# Patient Record
Sex: MALE | Race: WHITE | NOT HISPANIC OR LATINO | URBAN - METROPOLITAN AREA
[De-identification: names, ages, dates, MRNs, and addresses within clinical notes are randomized per-mention and may not be internally consistent; named-entity substitution may affect disease eponyms.]

---

## 2020-08-18 ENCOUNTER — EMERGENCY (EMERGENCY)
Facility: HOSPITAL | Age: 40
LOS: 1 days | Discharge: HOME | End: 2020-08-18
Attending: EMERGENCY MEDICINE | Admitting: EMERGENCY MEDICINE
Payer: COMMERCIAL

## 2020-08-18 VITALS
TEMPERATURE: 99 F | SYSTOLIC BLOOD PRESSURE: 165 MMHG | DIASTOLIC BLOOD PRESSURE: 72 MMHG | WEIGHT: 225.09 LBS | OXYGEN SATURATION: 96 % | HEART RATE: 99 BPM | RESPIRATION RATE: 18 BRPM

## 2020-08-18 LAB
ALBUMIN SERPL ELPH-MCNC: 4 G/DL — SIGNIFICANT CHANGE UP (ref 3.4–5)
ALP SERPL-CCNC: 86 U/L — SIGNIFICANT CHANGE UP (ref 40–120)
ALT FLD-CCNC: 40 U/L — SIGNIFICANT CHANGE UP (ref 12–42)
AMYLASE P1 CFR SERPL: 37 U/L — SIGNIFICANT CHANGE UP (ref 25–115)
ANION GAP SERPL CALC-SCNC: 9 MMOL/L — SIGNIFICANT CHANGE UP (ref 9–16)
AST SERPL-CCNC: 30 U/L — SIGNIFICANT CHANGE UP (ref 15–37)
BILIRUB SERPL-MCNC: 0.4 MG/DL — SIGNIFICANT CHANGE UP (ref 0.2–1.2)
BUN SERPL-MCNC: 13 MG/DL — SIGNIFICANT CHANGE UP (ref 7–23)
CALCIUM SERPL-MCNC: 9 MG/DL — SIGNIFICANT CHANGE UP (ref 8.5–10.5)
CHLORIDE SERPL-SCNC: 103 MMOL/L — SIGNIFICANT CHANGE UP (ref 96–108)
CO2 SERPL-SCNC: 28 MMOL/L — SIGNIFICANT CHANGE UP (ref 22–31)
CREAT SERPL-MCNC: 1.12 MG/DL — SIGNIFICANT CHANGE UP (ref 0.5–1.3)
GLUCOSE SERPL-MCNC: 106 MG/DL — HIGH (ref 70–99)
HCT VFR BLD CALC: 41.8 % — SIGNIFICANT CHANGE UP (ref 39–50)
HGB BLD-MCNC: 13.9 G/DL — SIGNIFICANT CHANGE UP (ref 13–17)
INR BLD: 1.07 — SIGNIFICANT CHANGE UP (ref 0.88–1.16)
LIDOCAIN IGE QN: 42 U/L — LOW (ref 73–393)
MCHC RBC-ENTMCNC: 29.8 PG — SIGNIFICANT CHANGE UP (ref 27–34)
MCHC RBC-ENTMCNC: 33.3 GM/DL — SIGNIFICANT CHANGE UP (ref 32–36)
MCV RBC AUTO: 89.5 FL — SIGNIFICANT CHANGE UP (ref 80–100)
NRBC # BLD: 0 /100 WBCS — SIGNIFICANT CHANGE UP (ref 0–0)
PLATELET # BLD AUTO: 212 K/UL — SIGNIFICANT CHANGE UP (ref 150–400)
POTASSIUM SERPL-MCNC: 3.8 MMOL/L — SIGNIFICANT CHANGE UP (ref 3.5–5.3)
POTASSIUM SERPL-SCNC: 3.8 MMOL/L — SIGNIFICANT CHANGE UP (ref 3.5–5.3)
PROT SERPL-MCNC: 7.9 G/DL — SIGNIFICANT CHANGE UP (ref 6.4–8.2)
PROTHROM AB SERPL-ACNC: 12.8 SEC — SIGNIFICANT CHANGE UP (ref 10.6–13.6)
RBC # BLD: 4.67 M/UL — SIGNIFICANT CHANGE UP (ref 4.2–5.8)
RBC # FLD: 14.3 % — SIGNIFICANT CHANGE UP (ref 10.3–14.5)
SODIUM SERPL-SCNC: 140 MMOL/L — SIGNIFICANT CHANGE UP (ref 132–145)
WBC # BLD: 10.97 K/UL — HIGH (ref 3.8–10.5)
WBC # FLD AUTO: 10.97 K/UL — HIGH (ref 3.8–10.5)

## 2020-08-18 PROCEDURE — 99285 EMERGENCY DEPT VISIT HI MDM: CPT

## 2020-08-18 PROCEDURE — 74176 CT ABD & PELVIS W/O CONTRAST: CPT | Mod: 26

## 2020-08-18 RX ORDER — TAMSULOSIN HYDROCHLORIDE 0.4 MG/1
1 CAPSULE ORAL
Qty: 3 | Refills: 0
Start: 2020-08-18 | End: 2020-08-20

## 2020-08-18 RX ORDER — KETOROLAC TROMETHAMINE 30 MG/ML
30 SYRINGE (ML) INJECTION ONCE
Refills: 0 | Status: DISCONTINUED | OUTPATIENT
Start: 2020-08-18 | End: 2020-08-18

## 2020-08-18 RX ORDER — IBUPROFEN 200 MG
1 TABLET ORAL
Qty: 20 | Refills: 0
Start: 2020-08-18 | End: 2020-08-22

## 2020-08-18 RX ORDER — SODIUM CHLORIDE 9 MG/ML
1000 INJECTION INTRAMUSCULAR; INTRAVENOUS; SUBCUTANEOUS ONCE
Refills: 0 | Status: COMPLETED | OUTPATIENT
Start: 2020-08-18 | End: 2020-08-18

## 2020-08-18 RX ADMIN — Medication 30 MILLIGRAM(S): at 18:35

## 2020-08-18 RX ADMIN — SODIUM CHLORIDE 1000 MILLILITER(S): 9 INJECTION INTRAMUSCULAR; INTRAVENOUS; SUBCUTANEOUS at 18:35

## 2020-08-18 NOTE — ED PROVIDER NOTE - NSFOLLOWUPINSTRUCTIONS_ED_ALL_ED_FT
Call for a primary care appointment this week.  Take ibuprofen 600mg every 6 hours as needed for pain.  Take Flomax 0.4mg at bedtime for three nights.  Return at any time if you have any concerns.

## 2020-08-18 NOTE — ED PROVIDER NOTE - NSFOLLOWUPCLINICS_GEN_ALL_ED_FT
[facility]  HIV/AIDS Research & Treatment  [address] [place] [address]  Phone: [phone]  Fax:   Follow Up Time:

## 2020-08-18 NOTE — ED PROVIDER NOTE - PATIENT PORTAL LINK FT
You can access the FollowMyHealth Patient Portal offered by [facility] by registering at the following website: [email or web address]. By joining [employer]’s FollowMyHealth portal, you will also be able to view your health information using other applications (apps) compatible with our system.

## 2020-08-18 NOTE — ED PROVIDER NOTE - CARE PROVIDERS DIRECT ADDRESSES
[email or web address]

## 2020-08-22 ENCOUNTER — EMERGENCY (EMERGENCY)
Facility: HOSPITAL | Age: 40
LOS: 1 days | Discharge: HOME | End: 2020-08-22
Attending: EMERGENCY MEDICINE | Admitting: EMERGENCY MEDICINE
Payer: COMMERCIAL

## 2020-08-22 ENCOUNTER — TRANSCRIPTION ENCOUNTER (OUTPATIENT)
Age: 40
End: 2020-08-22

## 2020-08-22 VITALS
SYSTOLIC BLOOD PRESSURE: 123 MMHG | WEIGHT: 225.09 LBS | DIASTOLIC BLOOD PRESSURE: 79 MMHG | OXYGEN SATURATION: 97 % | HEIGHT: 74 IN | TEMPERATURE: 99 F | HEART RATE: 105 BPM | RESPIRATION RATE: 18 BRPM

## 2020-08-22 DIAGNOSIS — N39.0 URINARY TRACT INFECTION, SITE NOT SPECIFIED: ICD-10-CM

## 2020-08-22 DIAGNOSIS — N23 UNSPECIFIED RENAL COLIC: ICD-10-CM

## 2020-08-22 DIAGNOSIS — R31.9 HEMATURIA, UNSPECIFIED: ICD-10-CM

## 2020-08-22 DIAGNOSIS — B20 HUMAN IMMUNODEFICIENCY VIRUS [HIV] DISEASE: ICD-10-CM

## 2020-08-22 DIAGNOSIS — R50.9 FEVER, UNSPECIFIED: ICD-10-CM

## 2020-08-22 DIAGNOSIS — N20.1 CALCULUS OF URETER: ICD-10-CM

## 2020-08-22 DIAGNOSIS — R11.0 NAUSEA: ICD-10-CM

## 2020-08-22 DIAGNOSIS — Z20.828 CONTACT WITH AND (SUSPECTED) EXPOSURE TO OTHER VIRAL COMMUNICABLE DISEASES: ICD-10-CM

## 2020-08-22 DIAGNOSIS — R10.9 UNSPECIFIED ABDOMINAL PAIN: ICD-10-CM

## 2020-08-22 LAB
ALBUMIN SERPL ELPH-MCNC: 3.8 G/DL — SIGNIFICANT CHANGE UP (ref 3.4–5)
ALP SERPL-CCNC: 82 U/L — SIGNIFICANT CHANGE UP (ref 40–120)
ALT FLD-CCNC: 17 U/L — SIGNIFICANT CHANGE UP (ref 12–42)
ANION GAP SERPL CALC-SCNC: 11 MMOL/L — SIGNIFICANT CHANGE UP (ref 9–16)
AST SERPL-CCNC: 14 U/L — LOW (ref 15–37)
BASOPHILS # BLD AUTO: 0 K/UL — SIGNIFICANT CHANGE UP (ref 0–0.2)
BASOPHILS NFR BLD AUTO: 0 % — SIGNIFICANT CHANGE UP (ref 0–2)
BILIRUB SERPL-MCNC: 0.9 MG/DL — SIGNIFICANT CHANGE UP (ref 0.2–1.2)
BUN SERPL-MCNC: 12 MG/DL — SIGNIFICANT CHANGE UP (ref 7–23)
CALCIUM SERPL-MCNC: 9.5 MG/DL — SIGNIFICANT CHANGE UP (ref 8.5–10.5)
CHLORIDE SERPL-SCNC: 97 MMOL/L — SIGNIFICANT CHANGE UP (ref 96–108)
CO2 SERPL-SCNC: 27 MMOL/L — SIGNIFICANT CHANGE UP (ref 22–31)
CREAT SERPL-MCNC: 1.13 MG/DL — SIGNIFICANT CHANGE UP (ref 0.5–1.3)
CRP SERPL-MCNC: >12 MG/DL — HIGH (ref 0–0.9)
EOSINOPHIL # BLD AUTO: 0 K/UL — SIGNIFICANT CHANGE UP (ref 0–0.5)
EOSINOPHIL NFR BLD AUTO: 0 % — SIGNIFICANT CHANGE UP (ref 0–6)
GLUCOSE SERPL-MCNC: 102 MG/DL — HIGH (ref 70–99)
HCT VFR BLD CALC: 43.7 % — SIGNIFICANT CHANGE UP (ref 39–50)
HGB BLD-MCNC: 15.2 G/DL — SIGNIFICANT CHANGE UP (ref 13–17)
LYMPHOCYTES # BLD AUTO: 1.58 K/UL — SIGNIFICANT CHANGE UP (ref 1–3.3)
LYMPHOCYTES # BLD AUTO: 12 % — LOW (ref 13–44)
MCHC RBC-ENTMCNC: 30.3 PG — SIGNIFICANT CHANGE UP (ref 27–34)
MCHC RBC-ENTMCNC: 34.8 GM/DL — SIGNIFICANT CHANGE UP (ref 32–36)
MCV RBC AUTO: 87.2 FL — SIGNIFICANT CHANGE UP (ref 80–100)
MONOCYTES # BLD AUTO: 2.5 K/UL — HIGH (ref 0–0.9)
MONOCYTES NFR BLD AUTO: 19 % — HIGH (ref 2–14)
NEUTROPHILS # BLD AUTO: 9.09 K/UL — HIGH (ref 1.8–7.4)
NEUTROPHILS NFR BLD AUTO: 69 % — SIGNIFICANT CHANGE UP (ref 43–77)
NRBC # BLD: SIGNIFICANT CHANGE UP /100 WBCS (ref 0–0)
PLATELET # BLD AUTO: 239 K/UL — SIGNIFICANT CHANGE UP (ref 150–400)
POTASSIUM SERPL-MCNC: 4.2 MMOL/L — SIGNIFICANT CHANGE UP (ref 3.5–5.3)
POTASSIUM SERPL-SCNC: 4.2 MMOL/L — SIGNIFICANT CHANGE UP (ref 3.5–5.3)
PROT SERPL-MCNC: 8.8 G/DL — HIGH (ref 6.4–8.2)
RBC # BLD: 5.01 M/UL — SIGNIFICANT CHANGE UP (ref 4.2–5.8)
RBC # FLD: 14.1 % — SIGNIFICANT CHANGE UP (ref 10.3–14.5)
SODIUM SERPL-SCNC: 135 MMOL/L — SIGNIFICANT CHANGE UP (ref 132–145)
WBC # BLD: 13.18 K/UL — HIGH (ref 3.8–10.5)
WBC # FLD AUTO: 13.18 K/UL — HIGH (ref 3.8–10.5)

## 2020-08-22 PROCEDURE — 71046 X-RAY EXAM CHEST 2 VIEWS: CPT | Mod: 26

## 2020-08-22 PROCEDURE — 99220: CPT

## 2020-08-22 RX ORDER — ACETAMINOPHEN 500 MG
975 TABLET ORAL ONCE
Refills: 0 | Status: COMPLETED | OUTPATIENT
Start: 2020-08-22 | End: 2020-08-22

## 2020-08-22 RX ORDER — PENICILLIN G BENZATHINE 1200000 [IU]/2ML
2.4 INJECTION, SUSPENSION INTRAMUSCULAR ONCE
Refills: 0 | Status: COMPLETED | OUTPATIENT
Start: 2020-08-22 | End: 2020-08-22

## 2020-08-22 RX ORDER — KETOROLAC TROMETHAMINE 30 MG/ML
30 SYRINGE (ML) INJECTION ONCE
Refills: 0 | Status: DISCONTINUED | OUTPATIENT
Start: 2020-08-22 | End: 2020-08-22

## 2020-08-22 RX ORDER — ONDANSETRON 8 MG/1
4 TABLET, FILM COATED ORAL ONCE
Refills: 0 | Status: COMPLETED | OUTPATIENT
Start: 2020-08-22 | End: 2020-08-22

## 2020-08-22 RX ORDER — CEFTRIAXONE 500 MG/1
1000 INJECTION, POWDER, FOR SOLUTION INTRAMUSCULAR; INTRAVENOUS ONCE
Refills: 0 | Status: COMPLETED | OUTPATIENT
Start: 2020-08-22 | End: 2020-08-22

## 2020-08-22 RX ORDER — SODIUM CHLORIDE 9 MG/ML
1000 INJECTION INTRAMUSCULAR; INTRAVENOUS; SUBCUTANEOUS ONCE
Refills: 0 | Status: COMPLETED | OUTPATIENT
Start: 2020-08-22 | End: 2020-08-22

## 2020-08-22 RX ADMIN — Medication 30 MILLIGRAM(S): at 22:28

## 2020-08-22 RX ADMIN — SODIUM CHLORIDE 1000 MILLILITER(S): 9 INJECTION INTRAMUSCULAR; INTRAVENOUS; SUBCUTANEOUS at 23:00

## 2020-08-22 RX ADMIN — Medication 30 MILLIGRAM(S): at 22:58

## 2020-08-22 RX ADMIN — CEFTRIAXONE 100 MILLIGRAM(S): 500 INJECTION, POWDER, FOR SOLUTION INTRAMUSCULAR; INTRAVENOUS at 22:28

## 2020-08-22 RX ADMIN — CEFTRIAXONE 1000 MILLIGRAM(S): 500 INJECTION, POWDER, FOR SOLUTION INTRAMUSCULAR; INTRAVENOUS at 22:28

## 2020-08-22 RX ADMIN — Medication 975 MILLIGRAM(S): at 22:28

## 2020-08-22 RX ADMIN — SODIUM CHLORIDE 1000 MILLILITER(S): 9 INJECTION INTRAMUSCULAR; INTRAVENOUS; SUBCUTANEOUS at 21:55

## 2020-08-22 RX ADMIN — Medication 975 MILLIGRAM(S): at 22:58

## 2020-08-22 RX ADMIN — ONDANSETRON 4 MILLIGRAM(S): 8 TABLET, FILM COATED ORAL at 21:56

## 2020-08-22 RX ADMIN — PENICILLIN G BENZATHINE 2.4 MILLION UNIT(S): 1200000 INJECTION, SUSPENSION INTRAMUSCULAR at 22:29

## 2020-08-22 NOTE — ED CDU PROVIDER INITIAL DAY NOTE - PROGRESS NOTE DETAILS
Sleeping, VSS, no issues Sx better, passed about 500 cc fairly dark urine after 3 L saline. He now says the stones that he passed were tiny. had 4mm stone on CT. Will check repeat CT to be sure the dn have a retained stone (though he has no pain). Also getting additional IVF. If CT neg will d/c on abx with  fu. Wet read from rads shows R UVJ stone 4mm. Flomax ordered and call pal flaquita to admit to [facility].  attending didn't . TFR center will continue to try.

## 2020-08-22 NOTE — ED ADULT NURSE NOTE - CHPI ED NUR SYMPTOMS NEG
no diarrhea/no dysuria/no hematuria/no abdominal distension/no vomiting/no blood in stool/no burning urination

## 2020-08-22 NOTE — ED ADULT TRIAGE NOTE - CHIEF COMPLAINT QUOTE
pt. c/o feeling unwell, reports for the last 3-4 days he has felt nauseous, poor appetite and a fever twice denies any vomiting.

## 2020-08-22 NOTE — ED CDU PROVIDER INITIAL DAY NOTE - MEDICAL DECISION MAKING DETAILS
Patient presenting with fever and rash  ? secondary syphilis. Seems like it has been present oo long for rickettsialpox, no Lyme RF and rash is atypical. Renals colic sx totally resolved and stone passes. Tx'd with CTX IV and PCN IM. Sepsis munson done and COVID swab sent. Will place on obs.

## 2020-08-22 NOTE — ED PROVIDER NOTE - CLINICAL SUMMARY MEDICAL DECISION MAKING FREE TEXT BOX
Patient presenting with fever and rash  ? secondary syphilis. Seems like it has been present oo long for rickettsialpox, no Lyme RF and rash is atypical. Renals colic sx totally resolved and stone passes. Tx'd with CTX IV and Pcn IM. Sepsis munson done and COVID swab sent. Will place on obs. Patient presenting with fever and rash  ? secondary syphilis. Seems like it has been present oo long for rickettsialpox, no Lyme RF and rash is atypical. Renals colic sx totally resolved and stone passes. Tx'd with CTX IV and Pcn IM. Sepsis munson done and COVID swab sent. Will place on obs.    Pt with persistent abd discomfort/loss of appetite, + UTI w/persistent R sided stone w/hydro, uptrending leukocytosis w/fever meeting sepsis criteria. Given ceftriaxone, cultures sent. Defervescing. HDS and comfortable in ED. Given infected stone, discussed with urology attending Dr. Williamson for admission. Recommending ED to ED transfer for urologic evaluation. Accepted by Dr. Siddiqi.

## 2020-08-22 NOTE — ED PROVIDER NOTE - CARE PLAN
Principal Discharge DX:	Fever, unspecified fever cause  Secondary Diagnosis:	Rash Principal Discharge DX:	Renal colic on right side  Secondary Diagnosis:	Rash  Secondary Diagnosis:	Urinary tract infection with hematuria, site unspecified  Secondary Diagnosis:	Asymptomatic HIV infection

## 2020-08-22 NOTE — ED PROVIDER NOTE - PHYSICAL EXAMINATION
VITAL SIGNS: I have reviewed nursing notes and confirm.  CONSTITUTIONAL: Well-developed; well-nourished; in no acute distress.  SKIN: Skin is hot to touch and dry, diffuse dot like maculopapular rash to arms, torso- some a re blanching, some look more salmon colors, no pustules, palms/soles and legs spared.   HEAD: Normocephalic; atraumatic.  EYES: Pupils round bilaterally, 3mm; conjunctiva and sclera clear.  ENT: No nasal discharge; airway clear, MMM.  NECK: Supple; non tender.  CARD: S1, S2 normal; no murmurs, gallops, or rubs. Regular rate and rhythm.  RESP: No wheezes, rales or rhonchi.  : No CVAT tenderness bilaterally   ABD: Soft; non-distended; non-tender; no rebound or guarding.  EXT: Normal ROM. No clubbing, cyanosis or edema.  NEURO: Alert, oriented. Grossly unremarkable. ALLEN, normal tone, no gross motor or sensory changes. Fluent speech.   PSYCH: Cooperative, appropriate. Mood and affect wnl.

## 2020-08-22 NOTE — ED CDU PROVIDER INITIAL DAY NOTE - OBJECTIVE STATEMENT
39 M presenting with loss of appetite x 4d, hasn't really eaten. Also has nausea and had temps at home to 101.7. Was seen here 4d ago for renal colic. He says that he passed the stones and that those sx feel better/have resolved. He has a diffuse rash to the arms/torse which he says are from July- he was told that it was a heat rash (but it never went away>     He also has HIV and has been off HAART since Jan- he moved to the [place] and didn't get a new PMD. He was also worried about class action lawsuits that he hear were ongoing against his HIV med. + MSM, no recent unprotected sex.     No COVID hx/sx.

## 2020-08-22 NOTE — ED ADULT NURSE NOTE - NSIMPLEMENTINTERV_GEN_ALL_ED
Implemented All Universal Safety Interventions:  [place] to call system. Call bell, personal items and telephone within reach. Instruct patient to call for assistance. Room bathroom lighting operational. Non-slip footwear when patient is off stretcher. Physically safe environment: no spills, clutter or unnecessary equipment. Stretcher in lowest position, wheels locked, appropriate side rails in place.

## 2020-08-22 NOTE — ED ADULT NURSE NOTE - OBJECTIVE STATEMENT
Pt w/ PMH of HIV and recent dx of kidney stone presents to ED today c/o x4 days of nausea w/o vomiting and poor appetite.  Pt is hot to touch w/ raised reddened rash across entire upper body.  Pt elicits "that's a heat rash."  Pt denies CP, cough, vomiting, diarrhea or dysuria.  Pt is pending labs and medication administration.

## 2020-08-22 NOTE — ED PROVIDER NOTE - OBJECTIVE STATEMENT
39 M presenting with loss of appetite x 4d, hasn't really eaten. Also has nausea and had temps at home to 101.7. Was seen here 4d ago for renal colic. He says that he passed the stones and that those sx feel better/have resolved. He ahs a diffuse rash to the arms/torse which he says are from July- he was told that it was a heat rash (biut it never went away>     He also has HIV and has been off HAART since Jan- he moved to the [place] and didn't get a new PMD. He was also worried about class action lawsuits that he hear were ongoing against his HIV med. + MSM, no recent unprotected sex.     No COVID hx/sx.

## 2020-08-23 ENCOUNTER — INPATIENT (INPATIENT)
Facility: HOSPITAL | Age: 40
LOS: 1 days | Discharge: HOME | DRG: 854 | End: 2020-08-25
Attending: UROLOGY | Admitting: UROLOGY
Payer: MEDICAID

## 2020-08-23 VITALS
DIASTOLIC BLOOD PRESSURE: 87 MMHG | TEMPERATURE: 99 F | RESPIRATION RATE: 18 BRPM | HEART RATE: 95 BPM | SYSTOLIC BLOOD PRESSURE: 144 MMHG | OXYGEN SATURATION: 98 %

## 2020-08-23 VITALS
DIASTOLIC BLOOD PRESSURE: 83 MMHG | OXYGEN SATURATION: 99 % | RESPIRATION RATE: 18 BRPM | WEIGHT: 225.09 LBS | TEMPERATURE: 99 F | SYSTOLIC BLOOD PRESSURE: 127 MMHG | HEIGHT: 74 IN | HEART RATE: 99 BPM

## 2020-08-23 DIAGNOSIS — N20.1 CALCULUS OF URETER: ICD-10-CM

## 2020-08-23 PROBLEM — Z21 ASYMPTOMATIC HUMAN IMMUNODEFICIENCY VIRUS [HIV] INFECTION STATUS: Chronic | Status: ACTIVE | Noted: 2020-08-18

## 2020-08-23 LAB
APPEARANCE UR: CLEAR — SIGNIFICANT CHANGE UP
APTT BLD: 30.6 SEC — SIGNIFICANT CHANGE UP (ref 27.5–35.5)
BILIRUB UR-MCNC: ABNORMAL
COLOR SPEC: YELLOW — SIGNIFICANT CHANGE UP
DIFF PNL FLD: ABNORMAL
GLUCOSE UR QL: NEGATIVE — SIGNIFICANT CHANGE UP
INR BLD: 1.17 — HIGH (ref 0.88–1.16)
KETONES UR-MCNC: ABNORMAL MG/DL
LACTATE SERPL-SCNC: 0.8 MMOL/L — SIGNIFICANT CHANGE UP (ref 0.4–2)
LEUKOCYTE ESTERASE UR-ACNC: ABNORMAL
NITRITE UR-MCNC: POSITIVE
PH UR: 6 — SIGNIFICANT CHANGE UP (ref 5–8)
PROT UR-MCNC: 100 MG/DL
PROTHROM AB SERPL-ACNC: 13.9 SEC — HIGH (ref 10.6–13.6)
SARS-COV-2 RNA SPEC QL NAA+PROBE: SIGNIFICANT CHANGE UP
SP GR SPEC: >=1.03 — SIGNIFICANT CHANGE UP (ref 1–1.03)
UROBILINOGEN FLD QL: 2 E.U./DL

## 2020-08-23 PROCEDURE — 71046 X-RAY EXAM CHEST 2 VIEWS: CPT | Mod: 26

## 2020-08-23 PROCEDURE — 72192 CT PELVIS W/O DYE: CPT | Mod: 26,59

## 2020-08-23 PROCEDURE — 93010 ELECTROCARDIOGRAM REPORT: CPT

## 2020-08-23 PROCEDURE — 99285 EMERGENCY DEPT VISIT HI MDM: CPT

## 2020-08-23 PROCEDURE — 99217: CPT

## 2020-08-23 PROCEDURE — 74176 CT ABD & PELVIS W/O CONTRAST: CPT | Mod: 26

## 2020-08-23 RX ORDER — PHENAZOPYRIDINE HCL 100 MG
100 TABLET ORAL EVERY 8 HOURS
Refills: 0 | Status: DISCONTINUED | OUTPATIENT
Start: 2020-08-23 | End: 2020-08-25

## 2020-08-23 RX ORDER — SODIUM CHLORIDE 9 MG/ML
1000 INJECTION INTRAMUSCULAR; INTRAVENOUS; SUBCUTANEOUS ONCE
Refills: 0 | Status: COMPLETED | OUTPATIENT
Start: 2020-08-23 | End: 2020-08-23

## 2020-08-23 RX ORDER — TAMSULOSIN HYDROCHLORIDE 0.4 MG/1
0.4 CAPSULE ORAL ONCE
Refills: 0 | Status: COMPLETED | OUTPATIENT
Start: 2020-08-23 | End: 2020-08-23

## 2020-08-23 RX ORDER — SODIUM CHLORIDE 9 MG/ML
1000 INJECTION, SOLUTION INTRAVENOUS
Refills: 0 | Status: COMPLETED | OUTPATIENT
Start: 2020-08-23 | End: 2020-08-23

## 2020-08-23 RX ORDER — TAMSULOSIN HYDROCHLORIDE 0.4 MG/1
0.4 CAPSULE ORAL AT BEDTIME
Refills: 0 | Status: DISCONTINUED | OUTPATIENT
Start: 2020-08-23 | End: 2020-08-25

## 2020-08-23 RX ORDER — PIPERACILLIN AND TAZOBACTAM 4; .5 G/20ML; G/20ML
3.38 INJECTION, POWDER, LYOPHILIZED, FOR SOLUTION INTRAVENOUS EVERY 6 HOURS
Refills: 0 | Status: DISCONTINUED | OUTPATIENT
Start: 2020-08-23 | End: 2020-08-25

## 2020-08-23 RX ORDER — ACETAMINOPHEN 500 MG
650 TABLET ORAL EVERY 6 HOURS
Refills: 0 | Status: DISCONTINUED | OUTPATIENT
Start: 2020-08-23 | End: 2020-08-25

## 2020-08-23 RX ORDER — SODIUM CHLORIDE 9 MG/ML
1000 INJECTION INTRAMUSCULAR; INTRAVENOUS; SUBCUTANEOUS
Refills: 0 | Status: DISCONTINUED | OUTPATIENT
Start: 2020-08-23 | End: 2020-08-24

## 2020-08-23 RX ORDER — PIPERACILLIN AND TAZOBACTAM 4; .5 G/20ML; G/20ML
3.38 INJECTION, POWDER, LYOPHILIZED, FOR SOLUTION INTRAVENOUS ONCE
Refills: 0 | Status: DISCONTINUED | OUTPATIENT
Start: 2020-08-23 | End: 2020-08-23

## 2020-08-23 RX ADMIN — PIPERACILLIN AND TAZOBACTAM 200 GRAM(S): 4; .5 INJECTION, POWDER, LYOPHILIZED, FOR SOLUTION INTRAVENOUS at 19:18

## 2020-08-23 RX ADMIN — SODIUM CHLORIDE 1000 MILLILITER(S): 9 INJECTION INTRAMUSCULAR; INTRAVENOUS; SUBCUTANEOUS at 02:44

## 2020-08-23 RX ADMIN — SODIUM CHLORIDE 1000 MILLILITER(S): 9 INJECTION INTRAMUSCULAR; INTRAVENOUS; SUBCUTANEOUS at 12:14

## 2020-08-23 RX ADMIN — SODIUM CHLORIDE 1000 MILLILITER(S): 9 INJECTION, SOLUTION INTRAVENOUS at 00:08

## 2020-08-23 RX ADMIN — TAMSULOSIN HYDROCHLORIDE 0.4 MILLIGRAM(S): 0.4 CAPSULE ORAL at 21:58

## 2020-08-23 RX ADMIN — TAMSULOSIN HYDROCHLORIDE 0.4 MILLIGRAM(S): 0.4 CAPSULE ORAL at 07:44

## 2020-08-23 RX ADMIN — SODIUM CHLORIDE 1000 MILLILITER(S): 9 INJECTION, SOLUTION INTRAVENOUS at 02:44

## 2020-08-23 RX ADMIN — SODIUM CHLORIDE 1000 MILLILITER(S): 9 INJECTION INTRAMUSCULAR; INTRAVENOUS; SUBCUTANEOUS at 06:21

## 2020-08-23 NOTE — ED ADULT NURSE REASSESSMENT NOTE - NS ED NURSE REASSESS COMMENT FT1
Assuming responsibility of care from NNAMDI huertas, patient is on observation for sepsis, with unknown source. Patient in NAD, will continue to assess.

## 2020-08-23 NOTE — ED ADULT NURSE NOTE - OBJECTIVE STATEMENT
40 y/o male w/ hx of HIV (currently not taking meds bc pt moved) presents to the ED as transfer from [facility] for renal stone that has now moved to the bladder. Pt c/o mild nausea and intermittent fevers. Pt was seen at [facility] on 8/18/2020 for right sided flank pain and dx w/ kidney stone. Pt returned to [facility] for nausea and persistent fevers. Pt to be evaluated by urology and have a stent potentially placed. Pt denies fever, chills, cough, SOB, CP, abd pain, NVD, dysuria, hematuria, and any other associated sx.

## 2020-08-23 NOTE — ED PROVIDER NOTE - PHYSICAL EXAMINATION
CONSTITUTIONAL: Well-appearing; well-nourished; in no apparent distress.   HEAD: Normocephalic; atraumatic.   EYES:  conjunctiva and sclera clear  ENT: normal nose; no rhinorrhea; normal pharynx with no erythema or lesions.   NECK: Supple; non-tender;   CARDIOVASCULAR: Normal S1, S2; no murmurs, rubs, or gallops. Regular rate and rhythm.   RESPIRATORY: Breathing easily; breath sounds clear and equal bilaterally; no wheezes, rhonchi, or rales.  GI: Soft; non-distended; non-tender; no palpable organomegaly. No CVA or spinal tenderness on exam.  EXT: No cyanosis or edema; N/V intact  SKIN: Normal for age and race; warm; dry; good turgor; no apparent lesions or rash.   NEURO: A & O x 3; face symmetric; grossly unremarkable.   PSYCHOLOGICAL: The patient’s mood and manner are appropriate.

## 2020-08-23 NOTE — H&P ADULT - HISTORY OF PRESENT ILLNESS
This is a 40 yo male that was transferred to us from [facility].  The problem had started on Tuesday 8/18, when he began complaining of right sided flank pain.  A CT was done and he was told he has a kidney stone- He was sent home and to told to strain his urine, which he did and later passed a small stone.  As the week continued the pain went away but the patient states that he had no appetite, he had nausea but no vomiting and had fever of 101. he ca This is a 38 yo male that was transferred to us from [facility].  On Tuesday 8/18,  patient went to [facility],  complaining of right sided flank pain.  A CT was done and he was found to have  a kidney stone- He was sent home and to told to strain his urine, which he did and later passed a small stone.  As the week continued the pain went away but the patient states that he had no appetite, he had nausea but no vomiting and had fever of 101. He came back to the ER and had an increasing white count and fever of 102.      Patient denies any abdominal or flank pain. He denies any hematuria or dysuria.  He admits to frequent urination with decreased volume each time, and  says it appears darker.

## 2020-08-23 NOTE — ED PROVIDER NOTE - CLINICAL SUMMARY MEDICAL DECISION MAKING FREE TEXT BOX
transferred for urologic eval for obstructing infected stone. On my eval pt now pain free. will check ct pelvis to assess if stone has passed into bladder as per  consult as this may require only IV abx then and no surgery.

## 2020-08-23 NOTE — ED ADULT NURSE NOTE - CHIEF COMPLAINT QUOTE
pt is transfer from [facility] for infected kidney stone (right).

## 2020-08-23 NOTE — ED PROVIDER NOTE - OBJECTIVE STATEMENT
39M transferred from [facility] for  eval. Pt with fevers, loss of appetite, + UTI w/persistent R sided 4mm stone w/hydro on CT done there, uptrending leukocytosis w/fever meeting sepsis criteria. Given ceftriaxone, cultures sent. At time of my eval pt denies abdominal pain, flank pain. Feeling better after meds and fluids given at [facility] including 3L NS.

## 2020-08-23 NOTE — H&P ADULT - NSHPLABSRESULTS_GEN_ALL_CORE
Vital Signs Last 24 Hrs  T(C): 37.2 (23 Aug 2020 10:02), Max: 38.9 (22 Aug 2020 22:05)  T(F): 99 (23 Aug 2020 10:02), Max: 102 (22 Aug 2020 22:05)  HR: 99 (23 Aug 2020 10:02) (75 - 105)  BP: 127/83 (23 Aug 2020 10:02) (116/81 - 144/87)  BP(mean): 101 (23 Aug 2020 00:00) (96 - 101)  RR: 18 (23 Aug 2020 10:02) (16 - 18)  SpO2: 99% (23 Aug 2020 10:02) (97% - 99%)                            15.2   13.18 )-----------( 239      ( 22 Aug 2020 21:59 )             43.7   08-22    135  |  97  |  12  ----------------------------<  102<H>  4.2   |  27  |  1.13    Ca    9.5      22 Aug 2020 21:59    TPro  8.8<H>  /  Alb  3.8  /  TBili  0.9  /  DBili  x   /  AST  14<L>  /  ALT  17  /  AlkPhos  82  08-22    Urinalysis Basic - ( 23 Aug 2020 01:13 )    Color: Yellow / Appearance: Clear / SG: >=1.030 / pH: x  Gluc: x / Ketone: Trace mg/dL  / Bili: Small / Urobili: 2.0 E.U./dL   Blood: x / Protein: 100 mg/dL / Nitrite: POSITIVE   Leuk Esterase: Moderate / RBC: 5-10 /HPF / WBC Many /HPF   Sq Epi: x / Non Sq Epi: 0-5 /HPF / Bacteria: Present /HPF    < from: CT Renal Stone Hunt (08.23.20 @ 06:36) >  IMPRESSION:  1.  Interval distal migration of distal ureteral stone since 8/18/2020, which is now identified in the right UVJ. Mild diffuse hydroureter, andnear complete interval resolution of hydronephrosis.  2.  Unchanged 2 additional nonobstructing right renal stones.    < end of copied text >

## 2020-08-23 NOTE — BRIEF OPERATIVE NOTE - NSICDXBRIEFPROCEDURE_GEN_ALL_CORE_FT
PROCEDURES:  Cystoscopy with retrograde pyelography with insertion of right ureteral stent 23-Aug-2020 13:43:00  Gerber Lala

## 2020-08-24 LAB
ANION GAP SERPL CALC-SCNC: 11 MMOL/L — SIGNIFICANT CHANGE UP (ref 5–17)
BUN SERPL-MCNC: 7 MG/DL — SIGNIFICANT CHANGE UP (ref 7–23)
C TRACH RRNA SPEC QL NAA+PROBE: SIGNIFICANT CHANGE UP
CALCIUM SERPL-MCNC: 8.7 MG/DL — SIGNIFICANT CHANGE UP (ref 8.4–10.5)
CHLORIDE SERPL-SCNC: 102 MMOL/L — SIGNIFICANT CHANGE UP (ref 96–108)
CO2 SERPL-SCNC: 23 MMOL/L — SIGNIFICANT CHANGE UP (ref 22–31)
CREAT SERPL-MCNC: 0.81 MG/DL — SIGNIFICANT CHANGE UP (ref 0.5–1.3)
CULTURE RESULTS: NO GROWTH — SIGNIFICANT CHANGE UP
GLUCOSE SERPL-MCNC: 109 MG/DL — HIGH (ref 70–99)
HCT VFR BLD CALC: 39 % — SIGNIFICANT CHANGE UP (ref 39–50)
HGB BLD-MCNC: 12.4 G/DL — LOW (ref 13–17)
MAGNESIUM SERPL-MCNC: 1.7 MG/DL — SIGNIFICANT CHANGE UP (ref 1.6–2.6)
MCHC RBC-ENTMCNC: 28.9 PG — SIGNIFICANT CHANGE UP (ref 27–34)
MCHC RBC-ENTMCNC: 31.8 GM/DL — LOW (ref 32–36)
MCV RBC AUTO: 90.9 FL — SIGNIFICANT CHANGE UP (ref 80–100)
N GONORRHOEA RRNA SPEC QL NAA+PROBE: SIGNIFICANT CHANGE UP
NRBC # BLD: 0 /100 WBCS — SIGNIFICANT CHANGE UP (ref 0–0)
PHOSPHATE SERPL-MCNC: 3 MG/DL — SIGNIFICANT CHANGE UP (ref 2.5–4.5)
PLATELET # BLD AUTO: 202 K/UL — SIGNIFICANT CHANGE UP (ref 150–400)
POTASSIUM SERPL-MCNC: 3.8 MMOL/L — SIGNIFICANT CHANGE UP (ref 3.5–5.3)
POTASSIUM SERPL-SCNC: 3.8 MMOL/L — SIGNIFICANT CHANGE UP (ref 3.5–5.3)
RBC # BLD: 4.29 M/UL — SIGNIFICANT CHANGE UP (ref 4.2–5.8)
RBC # FLD: 13.9 % — SIGNIFICANT CHANGE UP (ref 10.3–14.5)
SODIUM SERPL-SCNC: 136 MMOL/L — SIGNIFICANT CHANGE UP (ref 135–145)
SPECIMEN SOURCE: SIGNIFICANT CHANGE UP
SPECIMEN SOURCE: SIGNIFICANT CHANGE UP
WBC # BLD: 7.05 K/UL — SIGNIFICANT CHANGE UP (ref 3.8–10.5)
WBC # FLD AUTO: 7.05 K/UL — SIGNIFICANT CHANGE UP (ref 3.8–10.5)

## 2020-08-24 RX ORDER — MAGNESIUM OXIDE 400 MG ORAL TABLET 241.3 MG
400 TABLET ORAL ONCE
Refills: 0 | Status: COMPLETED | OUTPATIENT
Start: 2020-08-24 | End: 2020-08-24

## 2020-08-24 RX ADMIN — TAMSULOSIN HYDROCHLORIDE 0.4 MILLIGRAM(S): 0.4 CAPSULE ORAL at 21:16

## 2020-08-24 RX ADMIN — PIPERACILLIN AND TAZOBACTAM 200 GRAM(S): 4; .5 INJECTION, POWDER, LYOPHILIZED, FOR SOLUTION INTRAVENOUS at 00:18

## 2020-08-24 RX ADMIN — PIPERACILLIN AND TAZOBACTAM 200 GRAM(S): 4; .5 INJECTION, POWDER, LYOPHILIZED, FOR SOLUTION INTRAVENOUS at 18:45

## 2020-08-24 RX ADMIN — PIPERACILLIN AND TAZOBACTAM 200 GRAM(S): 4; .5 INJECTION, POWDER, LYOPHILIZED, FOR SOLUTION INTRAVENOUS at 07:01

## 2020-08-24 RX ADMIN — MAGNESIUM OXIDE 400 MG ORAL TABLET 400 MILLIGRAM(S): 241.3 TABLET ORAL at 10:19

## 2020-08-24 RX ADMIN — PIPERACILLIN AND TAZOBACTAM 200 GRAM(S): 4; .5 INJECTION, POWDER, LYOPHILIZED, FOR SOLUTION INTRAVENOUS at 13:12

## 2020-08-24 NOTE — PROGRESS NOTE ADULT - SUBJECTIVE AND OBJECTIVE BOX
AM Note    No acute events overnight.      Vital Signs Last 24 Hrs  T(C): 37.3 (08-24-20 @ 05:45), Max: 37.8 (08-23-20 @ 18:37)  T(F): 99.2 (08-24-20 @ 05:45), Max: 100.1 (08-23-20 @ 18:37)  HR: 78 (08-24-20 @ 04:18) (68 - 99)  BP: 114/66 (08-24-20 @ 04:18) (106/57 - 144/87)  BP(mean): 85 (08-24-20 @ 04:18) (75 - 89)  RR: 20 (08-24-20 @ 04:18) (16 - 24)  SpO2: 96% (08-24-20 @ 04:18) (96% - 99%)     22 Aug 2020 21:59    135    |  97     |  12     ----------------------------<  102    4.2     |  27     |  1.13     Ca    9.5        22 Aug 2020 21:59    TPro  8.8    /  Alb  3.8    /  TBili  0.9    /  DBili  x      /  AST  14     /  ALT  17     /  AlkPhos  82     22 Aug 2020 21:59                          15.2   13.18 )-----------( 239      ( 22 Aug 2020 21:59 )             43.7         I&O's Summary    23 Aug 2020 07:01  -  24 Aug 2020 06:06  --------------------------------------------------------  IN: 2700 mL / OUT: 871 mL / NET: 1829 mL          PHYSICAL EXAM:    GEN: awake and alert  ABD: nontender, nondistended  : no suprapubic/CVAT

## 2020-08-25 ENCOUNTER — TRANSCRIPTION ENCOUNTER (OUTPATIENT)
Age: 40
End: 2020-08-25

## 2020-08-25 VITALS
SYSTOLIC BLOOD PRESSURE: 103 MMHG | HEART RATE: 73 BPM | RESPIRATION RATE: 17 BRPM | DIASTOLIC BLOOD PRESSURE: 71 MMHG | OXYGEN SATURATION: 97 % | TEMPERATURE: 99 F

## 2020-08-25 LAB
ANION GAP SERPL CALC-SCNC: 10 MMOL/L — SIGNIFICANT CHANGE UP (ref 5–17)
BUN SERPL-MCNC: 7 MG/DL — SIGNIFICANT CHANGE UP (ref 7–23)
CALCIUM SERPL-MCNC: 9.1 MG/DL — SIGNIFICANT CHANGE UP (ref 8.4–10.5)
CHLORIDE SERPL-SCNC: 104 MMOL/L — SIGNIFICANT CHANGE UP (ref 96–108)
CO2 SERPL-SCNC: 26 MMOL/L — SIGNIFICANT CHANGE UP (ref 22–31)
CREAT SERPL-MCNC: 0.85 MG/DL — SIGNIFICANT CHANGE UP (ref 0.5–1.3)
CULTURE RESULTS: NO GROWTH — SIGNIFICANT CHANGE UP
GLUCOSE SERPL-MCNC: 104 MG/DL — HIGH (ref 70–99)
HCT VFR BLD CALC: 38.1 % — LOW (ref 39–50)
HGB BLD-MCNC: 12.4 G/DL — LOW (ref 13–17)
MAGNESIUM SERPL-MCNC: 1.8 MG/DL — SIGNIFICANT CHANGE UP (ref 1.6–2.6)
MCHC RBC-ENTMCNC: 29.1 PG — SIGNIFICANT CHANGE UP (ref 27–34)
MCHC RBC-ENTMCNC: 32.5 GM/DL — SIGNIFICANT CHANGE UP (ref 32–36)
MCV RBC AUTO: 89.4 FL — SIGNIFICANT CHANGE UP (ref 80–100)
NRBC # BLD: 0 /100 WBCS — SIGNIFICANT CHANGE UP (ref 0–0)
PHOSPHATE SERPL-MCNC: 3.7 MG/DL — SIGNIFICANT CHANGE UP (ref 2.5–4.5)
PLATELET # BLD AUTO: 232 K/UL — SIGNIFICANT CHANGE UP (ref 150–400)
POTASSIUM SERPL-MCNC: 4.1 MMOL/L — SIGNIFICANT CHANGE UP (ref 3.5–5.3)
POTASSIUM SERPL-SCNC: 4.1 MMOL/L — SIGNIFICANT CHANGE UP (ref 3.5–5.3)
RBC # BLD: 4.26 M/UL — SIGNIFICANT CHANGE UP (ref 4.2–5.8)
RBC # FLD: 14.2 % — SIGNIFICANT CHANGE UP (ref 10.3–14.5)
SODIUM SERPL-SCNC: 140 MMOL/L — SIGNIFICANT CHANGE UP (ref 135–145)
SPECIMEN SOURCE: SIGNIFICANT CHANGE UP
T PALLIDUM AB TITR SER: POSITIVE
WBC # BLD: 5.91 K/UL — SIGNIFICANT CHANGE UP (ref 3.8–10.5)
WBC # FLD AUTO: 5.91 K/UL — SIGNIFICANT CHANGE UP (ref 3.8–10.5)

## 2020-08-25 PROCEDURE — 71046 X-RAY EXAM CHEST 2 VIEWS: CPT

## 2020-08-25 PROCEDURE — 74176 CT ABD & PELVIS W/O CONTRAST: CPT

## 2020-08-25 PROCEDURE — 84100 ASSAY OF PHOSPHORUS: CPT

## 2020-08-25 PROCEDURE — 72192 CT PELVIS W/O DYE: CPT

## 2020-08-25 PROCEDURE — 85610 PROTHROMBIN TIME: CPT

## 2020-08-25 PROCEDURE — 99285 EMERGENCY DEPT VISIT HI MDM: CPT | Mod: 25

## 2020-08-25 PROCEDURE — 85027 COMPLETE CBC AUTOMATED: CPT

## 2020-08-25 PROCEDURE — 85730 THROMBOPLASTIN TIME PARTIAL: CPT

## 2020-08-25 PROCEDURE — 36415 COLL VENOUS BLD VENIPUNCTURE: CPT

## 2020-08-25 PROCEDURE — C1769: CPT

## 2020-08-25 PROCEDURE — C1889: CPT

## 2020-08-25 PROCEDURE — C2617: CPT

## 2020-08-25 PROCEDURE — 86901 BLOOD TYPING SEROLOGIC RH(D): CPT

## 2020-08-25 PROCEDURE — 76000 FLUOROSCOPY <1 HR PHYS/QHP: CPT

## 2020-08-25 PROCEDURE — C1758: CPT

## 2020-08-25 PROCEDURE — 93005 ELECTROCARDIOGRAM TRACING: CPT

## 2020-08-25 PROCEDURE — 80048 BASIC METABOLIC PNL TOTAL CA: CPT

## 2020-08-25 PROCEDURE — 74176 CT ABD & PELVIS W/O CONTRAST: CPT | Mod: 26

## 2020-08-25 PROCEDURE — 86850 RBC ANTIBODY SCREEN: CPT

## 2020-08-25 PROCEDURE — 87086 URINE CULTURE/COLONY COUNT: CPT

## 2020-08-25 PROCEDURE — 83735 ASSAY OF MAGNESIUM: CPT

## 2020-08-25 RX ORDER — MAGNESIUM OXIDE 400 MG ORAL TABLET 241.3 MG
400 TABLET ORAL ONCE
Refills: 0 | Status: COMPLETED | OUTPATIENT
Start: 2020-08-25 | End: 2020-08-25

## 2020-08-25 RX ADMIN — PIPERACILLIN AND TAZOBACTAM 200 GRAM(S): 4; .5 INJECTION, POWDER, LYOPHILIZED, FOR SOLUTION INTRAVENOUS at 06:15

## 2020-08-25 RX ADMIN — PIPERACILLIN AND TAZOBACTAM 200 GRAM(S): 4; .5 INJECTION, POWDER, LYOPHILIZED, FOR SOLUTION INTRAVENOUS at 00:08

## 2020-08-25 RX ADMIN — Medication 1 TABLET(S): at 10:30

## 2020-08-25 RX ADMIN — MAGNESIUM OXIDE 400 MG ORAL TABLET 400 MILLIGRAM(S): 241.3 TABLET ORAL at 07:23

## 2020-08-25 NOTE — DISCHARGE NOTE PROVIDER - NSDCMRMEDTOKEN_GEN_ALL_CORE_FT
mg oral tablet: 1 tab(s) orally every 6 hours prn pain Augmentin 500 mg-125 mg oral tablet: 1 tab(s) orally every 12 hours MDD:2   mg oral tablet: 1 tab(s) orally every 6 hours prn pain

## 2020-08-25 NOTE — PROGRESS NOTE ADULT - SUBJECTIVE AND OBJECTIVE BOX
INTERVAL HPI/OVERNIGHT EVENTS:  No acute events overnight.    VITALS:    T(F): 98.9 (08-24-20 @ 20:27), Max: 98.9 (08-24-20 @ 20:27)  HR: 86 (08-24-20 @ 20:27) (72 - 86)  BP: 103/72 (08-24-20 @ 20:27) (103/72 - 116/70)  RR: 17 (08-24-20 @ 20:27) (17 - 18)  SpO2: 99% (08-24-20 @ 20:27) (98% - 99%)  Wt(kg): --    I&O's Detail    23 Aug 2020 07:01  -  24 Aug 2020 07:00  --------------------------------------------------------  IN:    IV PiggyBack: 300 mL    Oral Fluid: 300 mL    Other: 1240 mL    sodium chloride 0.9%: 1680 mL  Total IN: 3520 mL    OUT:    Other: 21 mL    Voided: 1250 mL  Total OUT: 1271 mL    Total NET: 2249 mL      24 Aug 2020 07:01  -  25 Aug 2020 05:50  --------------------------------------------------------  IN:    Oral Fluid: 360 mL  Total IN: 360 mL    OUT:    Voided: 2550 mL  Total OUT: 2550 mL    Total NET: -2190 mL          MEDICATIONS:    ANTIBIOTICS:  piperacillin/tazobactam IVPB.. 3.375 Gram(s) IV Intermittent every 6 hours      PAIN CONTROL:  acetaminophen   Tablet .. 650 milliGRAM(s) Oral every 6 hours PRN       MEDS:  phenazopyridine 100 milliGRAM(s) Oral every 8 hours PRN      HEME/ONC        PHYSICAL EXAM:  General: No acute distress.  Alert and Oriented  Abdominal Exam: soft nt/nd. Negative CVAT B/L   Exam: Negative SP tenderness.       LABS:                        12.4   7.05  )-----------( 202      ( 24 Aug 2020 08:11 )             39.0     08-24    136  |  102  |  7   ----------------------------<  109<H>  3.8   |  23  |  0.81    Ca    8.7      24 Aug 2020 08:11  Phos  3.0     08-24  Mg     1.7     08-24      PT/INR - ( 23 Aug 2020 11:25 )   PT: 13.9 sec;   INR: 1.17          PTT - ( 23 Aug 2020 11:25 )  PTT:30.6 sec      RADIOLOGY & ADDITIONAL TESTS:    ASSESSMENT: 39yMale w/ R UVJ stone s/p Cysto Right Ureteral Stent     PLAN:  Diet: Reg  Pain control  Monitor Urine Output  DVT ppx  Cont Abx: Zosyn  OOB/IS

## 2020-08-25 NOTE — DISCHARGE NOTE PROVIDER - HOSPITAL COURSE
38 yo male was diagnosed with R renal colic on 8/18 at [facility]. Patient sent home on po pain medications. Patient returned to [facility] c/o     fever and loss of apetite. U/A was positive and patient met sepsis criteria with uptrending leukocytosis with fever. CT a/p c/w persistent     R ureteral stone. Patient was transferred to [facility] for further care. Patient placed on IV antibx, IVF's, and cultures sent. Patient underwent a     cystoscopy and Right ureteral stent in OR on 8/23. Tolerated procedure well. Blood and urine cultures were negative. Patient switched    to oral antibiotics. Voiding well. To f/u as outpt. 40 yo male was diagnosed with R renal colic on 8/18 at [facility]. Patient sent home on po pain medications. Patient returned to [facility] c/o     fever and loss of apetite. U/A was positive and patient met sepsis criteria with uptrending leukocytosis with fever. CT a/p c/w persistent     R ureteral stone. Patient was transferred to [facility] for further care. Patient placed on IV antibx, IVF's, and cultures sent. Patient underwent a     cystoscopy and Right ureteral stent in OR on 8/23. Tolerated procedure well. Blood and urine cultures were negative. Patient switched    to oral antibiotics. Voiding well. To f/u as outpt. CT stone moore 8/25 showed no ureteral stones. Patient to f/u next week friday at urology     clinic for stent removal .

## 2020-08-25 NOTE — DISCHARGE NOTE PROVIDER - NSDCACTIVITY_GEN_ALL_CORE
Stairs allowed/Walking - Indoors allowed/Walking - Outdoors allowed/Return to Work/School allowed/Showering allowed/No heavy lifting/straining/Driving allowed

## 2020-08-25 NOTE — DISCHARGE NOTE PROVIDER - NSDCCPCAREPLAN_GEN_ALL_CORE_FT
PRINCIPAL DISCHARGE DIAGNOSIS  Diagnosis: Clinical sepsis  Assessment and Plan of Treatment: From persistent Right ureteral stone.   Call MD for increase abdominal pain, nausea, vomiting, temperature >101.4F, or for difficulty voiding.  Continue antibiotics by mouth as prescribed.   Follow up Dr. Williamson as outpatient.      SECONDARY DISCHARGE DIAGNOSES  Diagnosis: Pyelonephritis  Assessment and Plan of Treatment: Continue antibiotics as prescribed. PRINCIPAL DISCHARGE DIAGNOSIS  Diagnosis: Clinical sepsis  Assessment and Plan of Treatment: From persistent Right ureteral stone.   Call MD for increase abdominal pain, nausea, vomiting, temperature >101.4F, or for difficulty voiding.  Continue antibiotics by mouth as prescribed.   Follow up next friday september 4th for stent removal.      SECONDARY DISCHARGE DIAGNOSES  Diagnosis: Pyelonephritis  Assessment and Plan of Treatment: Continue antibiotics as prescribed.

## 2020-08-25 NOTE — DISCHARGE NOTE NURSING/CASE MANAGEMENT/SOCIAL WORK - PATIENT PORTAL LINK FT
You can access the FollowMyHealth Patient Portal offered by [facility] by registering at the following website: [email or web address]. By joining [employer]’s FollowMyHealth portal, you will also be able to view your health information using other applications (apps) compatible with our system.

## 2020-08-25 NOTE — DISCHARGE NOTE PROVIDER - NSDCFUADDINST_GEN_ALL_CORE_FT
Please call UROLOGY CLINIC  and leave message to make appointment for stent removal next friday september 4th.

## 2020-08-25 NOTE — DISCHARGE NOTE PROVIDER - CARE PROVIDER_API CALL
Raul Williamson  UROLOGY  [address] [place] [address] [place] [address]  Phone: [phone]  Fax: [phone]  Follow Up Time:

## 2020-08-25 NOTE — DISCHARGE NOTE PROVIDER - NSFOLLOWUPCLINICS_GEN_ALL_ED_FT
A Urologist  Urology  .  [place]   Phone:   Fax:     [facility]  Urology  [address], [address] [place] [address]  Phone: [phone]  Fax:   Follow Up Time:

## 2020-08-26 NOTE — ED POST DISCHARGE NOTE - DETAILS
Aware of positive RPR, patient tx here with pen G. sx improved. All questions answered to satisfaction

## 2020-08-28 DIAGNOSIS — N20.1 CALCULUS OF URETER: ICD-10-CM

## 2020-08-28 DIAGNOSIS — Z21 ASYMPTOMATIC HUMAN IMMUNODEFICIENCY VIRUS [HIV] INFECTION STATUS: ICD-10-CM

## 2020-08-28 DIAGNOSIS — N13.2 HYDRONEPHROSIS WITH RENAL AND URETERAL CALCULOUS OBSTRUCTION: ICD-10-CM

## 2020-08-28 DIAGNOSIS — A41.9 SEPSIS, UNSPECIFIED ORGANISM: ICD-10-CM

## 2020-08-28 DIAGNOSIS — N12 TUBULO-INTERSTITIAL NEPHRITIS, NOT SPECIFIED AS ACUTE OR CHRONIC: ICD-10-CM

## 2020-08-28 PROBLEM — N23 UNSPECIFIED RENAL COLIC: Chronic | Status: ACTIVE | Noted: 2020-08-22

## 2020-08-28 LAB
CULTURE RESULTS: SIGNIFICANT CHANGE UP
CULTURE RESULTS: SIGNIFICANT CHANGE UP
SPECIMEN SOURCE: SIGNIFICANT CHANGE UP
SPECIMEN SOURCE: SIGNIFICANT CHANGE UP

## 2020-09-03 PROBLEM — Z00.00 ENCOUNTER FOR PREVENTIVE HEALTH EXAMINATION: Status: ACTIVE | Noted: 2020-09-03

## 2020-09-04 ENCOUNTER — APPOINTMENT (OUTPATIENT)
Dept: UROLOGY | Facility: CLINIC | Age: 40
End: 2020-09-04

## 2020-09-04 ENCOUNTER — OUTPATIENT (OUTPATIENT)
Dept: OUTPATIENT SERVICES | Facility: HOSPITAL | Age: 40
LOS: 1 days | End: 2020-09-04

## 2020-09-04 VITALS
WEIGHT: 220 LBS | DIASTOLIC BLOOD PRESSURE: 85 MMHG | HEART RATE: 80 BPM | BODY MASS INDEX: 28.23 KG/M2 | HEIGHT: 74 IN | RESPIRATION RATE: 15 BRPM | SYSTOLIC BLOOD PRESSURE: 125 MMHG | TEMPERATURE: 98.6 F

## 2020-09-04 DIAGNOSIS — N20.1 CALCULUS OF URETER: ICD-10-CM

## 2020-09-04 DIAGNOSIS — Z78.9 OTHER SPECIFIED HEALTH STATUS: ICD-10-CM

## 2020-09-04 DIAGNOSIS — N28.9 DISORDER OF KIDNEY AND URETER, UNSPECIFIED: ICD-10-CM

## 2020-09-04 RX ORDER — AMOXICILLIN AND CLAVULANATE POTASSIUM 500; 125 MG/1; MG/1
500-125 TABLET, FILM COATED ORAL
Refills: 0 | Status: ACTIVE | COMMUNITY

## 2020-09-08 NOTE — PHYSICAL EXAM
[Normal Appearance] : normal appearance [Edema] : no peripheral edema [] : no respiratory distress [Abdomen Soft] : soft [Abdomen Tenderness] : non-tender [No Focal Deficits] : no focal deficits [FreeTextEntry1] : No suprapubic tenderness or CVA tenderness bilaterally

## 2020-09-09 DIAGNOSIS — N20.1 CALCULUS OF URETER: ICD-10-CM

## 2020-09-21 NOTE — ED CDU PROVIDER DISPOSITION NOTE - CLINICAL COURSE
Persistent flank pain w/concern for infected R kidney stone, given pain control, IV abx, cultures sent, d/w urology at [facility], requesting ED to ED transfer for urologic eval. Dr. Siddiqi oh [facility] ED aware and accepting transfer.

## 2021-07-27 ENCOUNTER — EMERGENCY (EMERGENCY)
Facility: HOSPITAL | Age: 41
LOS: 1 days | Discharge: HOME | End: 2021-07-27
Admitting: EMERGENCY MEDICINE
Payer: COMMERCIAL

## 2021-07-27 VITALS
WEIGHT: 214.95 LBS | SYSTOLIC BLOOD PRESSURE: 122 MMHG | OXYGEN SATURATION: 97 % | RESPIRATION RATE: 18 BRPM | TEMPERATURE: 99 F | DIASTOLIC BLOOD PRESSURE: 81 MMHG | HEIGHT: 74 IN | HEART RATE: 99 BPM

## 2021-07-27 DIAGNOSIS — L03.115 CELLULITIS OF RIGHT LOWER LIMB: ICD-10-CM

## 2021-07-27 DIAGNOSIS — B20 HUMAN IMMUNODEFICIENCY VIRUS [HIV] DISEASE: ICD-10-CM

## 2021-07-27 DIAGNOSIS — Z88.6 ALLERGY STATUS TO ANALGESIC AGENT: ICD-10-CM

## 2021-07-27 DIAGNOSIS — M79.661 PAIN IN RIGHT LOWER LEG: ICD-10-CM

## 2021-07-27 DIAGNOSIS — Z79.899 OTHER LONG TERM (CURRENT) DRUG THERAPY: ICD-10-CM

## 2021-07-27 PROCEDURE — 99284 EMERGENCY DEPT VISIT MOD MDM: CPT

## 2021-07-27 PROCEDURE — 93971 EXTREMITY STUDY: CPT | Mod: 26,RT

## 2021-07-27 RX ORDER — AZTREONAM 2 G
800 VIAL (EA) INJECTION
Qty: 14 | Refills: 0
Start: 2021-07-27 | End: 2021-08-02

## 2021-07-27 RX ORDER — CEPHALEXIN 500 MG
500 CAPSULE ORAL ONCE
Refills: 0 | Status: COMPLETED | OUTPATIENT
Start: 2021-07-27 | End: 2021-07-27

## 2021-07-27 RX ORDER — IBUPROFEN 200 MG
1 TABLET ORAL
Qty: 28 | Refills: 0
Start: 2021-07-27 | End: 2021-08-02

## 2021-07-27 RX ORDER — CEPHALEXIN 500 MG
1 CAPSULE ORAL
Qty: 28 | Refills: 0
Start: 2021-07-27 | End: 2021-08-02

## 2021-07-27 RX ORDER — IBUPROFEN 200 MG
600 TABLET ORAL ONCE
Refills: 0 | Status: COMPLETED | OUTPATIENT
Start: 2021-07-27 | End: 2021-07-27

## 2021-07-27 RX ADMIN — Medication 1 TABLET(S): at 18:02

## 2021-07-27 RX ADMIN — Medication 600 MILLIGRAM(S): at 18:02

## 2021-07-27 RX ADMIN — Medication 500 MILLIGRAM(S): at 18:03

## 2021-07-27 NOTE — ED PROVIDER NOTE - MDM ORDERS SUBMITTED SELECTION
Alert-The patient is alert, awake and responds to voice. The patient is oriented to time, place, and person. The triage nurse is able to obtain subjective information.
Imaging Studies/Medications

## 2021-07-27 NOTE — ED PROVIDER NOTE - HIV CLINIC
Discharge Instructions    Discharged to home by car with relative. Discharged via wheelchair, hospital escort: Yes.  Special equipment needed: Not Applicable    Be sure to schedule a follow-up appointment with your primary care doctor or any specialists as instructed.     Discharge Plan:   Follow up with your PCP for a post discharge visit in 1-2 weeks.   Follow up with a pulmonologist in about 2 weeks.  Have pulmonary function testing done in about 1 month    Take dexamethasone 4mg daily for 1 week, followed by dexamethasone 2mg daily for another week.  We have prescribed the spiriva inhaler. Use this once daily.      Influenza Vaccine Indication: Indicated: 9 to 64 years of age    I understand that a diet low in cholesterol, fat, and sodium is recommended for good health. Unless I have been given specific instructions below for another diet, I accept this instruction as my diet prescription.     Special Instructions: Chronic Obstructive Pulmonary Disease (COPD) is a long-term, progressive lung disease that makes it harder to breathe. It includes chronic bronchitis, emphysema, and refractory (non-reversible) asthma. With COPD, the airways in your lungs become inflamed and thicken, and the tissue where oxygen is exchanged is destroyed. The flow of air in and out of your lungs decreases. When that happens, less oxygen gets into your body tissues, and it becomes harder to get rid of the waste gas carbon dioxide. As the disease gets worse, shortness of breath makes it harder to remain active. There is no cure for COPD, but it is preventable and treatable.    COPD Patient Discharge Instructions    • Diet  o Follow a low fat, low cholesterol, low salt diet unless instructed otherwise by your Doctor. Read the labels on the back of food products and track your intake of fat, cholesterol and salt.  • No smoking  o Discontinuing smoking will have the biggest impact on preventing progression of disease.  o To participate in  [facility]’s Quit Tobacco Program, call [phone] or visit [email or web address]  • Oxygen  o If your doctor has order that you wear oxygen at home, it is important to wear it as ordered.  o Do not smoke, vape, or use e-cigarettes with oxygen on.  o Store in an appropriate location: upright in its tidwell or laid flat down, away from open flames and stoves.   o Do not use oil-based creams and moisturizers (ie: petroleum products, oil-based lip moisturizers) or aerosol sprays (ie: hair sprays or deodorants) when using your oxygen equipment.  o Be careful with tubing placement to prevent yourself and others from tripping.  • Medications  o Refer to your personalized Action Plan to manage your symptoms.  • Warning signs of an exacerbation  o Breathing fast and shallow, worsening shortness of breath (like you just finished exercising)  o Chest tightness  o Increases in sputum production  o Changes in sputum color  o Lower oxygen levels than baseline  • When to call your doctor  o If the warning signs of an exacerbation do not improve  o Refer to your personalized Action Plan   • Pulmonary Rehab  o Your doctor has ordered you a referral to Pulmonary Rehab. Call [phone] to schedule an appointment  • Attend your follow-up appointment with your PCP and/or Pulmonologist  See the educational handout provided by your COPD Navigator for more information. This also explains more about COPD, symptoms of an exacerbation, and some of the tests that you have undergone.    · Is patient discharged on Warfarin / Coumadin?   No   Chronic Bronchitis, Adult  Chronic bronchitis is long-lasting inflammation of the tubes that carry air into your lungs (bronchial tubes). This is inflammation that occurs:  On most days of the week.  For at least three months at a time.  Over a period of two years in a row.  When the bronchial tubes are inflamed, they start to produce mucus. The inflammation and buildup of mucus make it more difficult to  breathe. Chronic bronchitis is usually a permanent problem. It is one type of chronic obstructive pulmonary disease (COPD). People with chronic bronchitis are more likely to get frequent colds or respiratory infections.  What are the causes?  Chronic bronchitis most often occurs in people who:  Have chronic, severe asthma.  Have a history of smoking.  Have asthma and smoke.  Have certain lung diseases.  Have had long-term exposure to certain irritating fumes or chemicals.  What are the signs or symptoms?  Symptoms of chronic bronchitis may include:  A cough that brings up mucus (productive cough).  Shortness of breath.  Loud breathing (wheezing).  Chest discomfort.  Frequent (recurring) colds or respiratory infections.  Certain things can trigger chronic bronchitis symptoms or make them worse, such as:  Infections.  Stopping certain medicines.  Smoking.  Exposure to chemicals.  How is this diagnosed?  This condition may be diagnosed based on:  Your symptoms and medical history.  A physical exam.  A chest X-ray.  Lung (pulmonary) function tests.  How is this treated?  There is no cure for chronic bronchitis, but treatment can help control your symptoms. Treatment may include:  Using a cool mist vaporizer or humidifier to make it easier to breathe.  Drinking more fluids. Drinking more makes your mucus thinner, which may make it easier to breathe.  Lifestyle changes, such as eating a healthier diet and getting more exercise.  Medicines, such as:  Inhalers to improve air flow in and out of your lungs.  Antibiotics to treat any bacterial infections you have, such as:  Lung infection (pneumonia).  Sinus infection.  A sudden, severe (acute) episode of bronchitis.  Oxygen therapy.  Preventing infections by keeping up to date on vaccinations, including the pneumonia and flu vaccines.  Pulmonary rehabilitation. This is a program that helps you manage your breathing problems and improve your quality of life. It may last for up  to 4-12 weeks and may include exercise programs, education, counseling, and treatment support.  Follow these instructions at home:  Medicines  Take over-the-counter and prescription medicines only as told by your health care provider.  If you were prescribed an antibiotic medicine, take it as told by your health care provider. Do not stop taking the antibiotic even if you start to feel better.  Preventing infections  Get vaccinations as told by your health care provider. Make sure you get a flu shot (influenza vaccine) every year.  Wash your hands often with soap and water. If soap and water are not available, use hand .  Avoid contact with people who have symptoms of a cold or the flu.  Managing symptoms    Do not smoke, and avoid secondhand smoke. Exposure to cigarette smoke or irritating chemicals will make bronchitis worse. If you smoke and you need help quitting, ask your health care provider. Quitting smoking will help your lungs heal faster.  Use an inhaler, cool mist vaporizer, or humidifier as told by your health care provider.  Avoid pollen, dust, animal dander, molds, smoke, and other things that cause shortness of breath or wheezing attacks.  Use oxygen therapy at home as directed. Follow instructions from your health care provider about how to use oxygen safely and take precautions to prevent fire. Make sure you never smoke while using oxygen or allow others to smoke in your home.  Do not wait to get medical care if you have any concerning symptoms or trouble breathing. Waiting could cause permanent injury and may be life threatening.  General instructions  Talk with your health care provider about what activities are safe for you and about possible exercise routines. Regular exercise is very important to help you feel better.  Drink enough fluids to keep your urine pale yellow.  Keep all follow-up visits as told by your health care provider. This is important.  Contact a health care provider  if:  You have coughing or shortness of breath that gets worse.  You have muscle aches.  You have chest pain.  Your mucus seems to get thicker.  Your mucus changes from clear or white to yellow, green, gray, or bloody.  Get help right away if:  Your usual medicines do not stop your wheezing.  You have severe difficulty breathing.  These symptoms may represent a serious problem that is an emergency. Do not wait to see if the symptoms will go away. Get medical help right away. Call your local emergency services (911 in the [place] Do not drive yourself to the hospital.  Summary  Chronic bronchitis is long-lasting inflammation of the tubes that carry air into your lungs (bronchial tubes).  Chronic bronchitis is usually a permanent problem. It is one type of chronic obstructive pulmonary disease (COPD).  There is no cure for chronic bronchitis, but treatment can help control your symptoms.  Do not smoke, and avoid secondhand smoke. Exposure to cigarette smoke or irritating chemicals will make bronchitis worse.  This information is not intended to replace advice given to you by your health care provider. Make sure you discuss any questions you have with your health care provider.  Document Released: 10/05/2007 Document Revised: 10/10/2019 Document Reviewed: 11/07/2018  [employer] Patient Education © 2020 [employer]      Depression / Suicide Risk    As you are discharged from this [facility] facility, it is important to learn how to keep safe from harming yourself.    Recognize the warning signs:  · Abrupt changes in personality, positive or negative- including increase in energy   · Giving away possessions  · Change in eating patterns- significant weight changes-  positive or negative  · Change in sleeping patterns- unable to sleep or sleeping all the time   · Unwillingness or inability to communicate  · Depression  · Unusual sadness, discouragement and loneliness  · Talk of wanting to die  · Neglect of personal  appearance   · Rebelliousness- reckless behavior  · Withdrawal from people/activities they love  · Confusion- inability to concentrate     If you or a loved one observes any of these behaviors or has concerns about self-harm, here's what you can do:  · Talk about it- your feelings and reasons for harming yourself  · Remove any means that you might use to hurt yourself (examples: pills, rope, extension cords, firearm)  · Get professional help from the community (Mental Health, Substance Abuse, psychological counseling)  · Do not be alone:Call your Safe Contact- someone whom you trust who will be there for you.  · Call your local CRISIS HOTLINE 200-8171 or 300-686-3962  · Call your local Children's Mobile Crisis Response Team [place] (110) 114-9638 or www.gamigo  · Call the toll free National Suicide Prevention Hotlines   · National Suicide Prevention Lifeline 926-953-XAWK (3607)  · National Hope Line Network 800-SUICIDE (982-7374)       Yes

## 2021-07-27 NOTE — ED PROVIDER NOTE - CARE PLAN
Principal Discharge DX:	Cellulitis of right lower leg   Principal Discharge DX:	Cellulitis of right leg  Goal:	Pain Control & Antibiotic Administration  Assessment and plan of treatment:	Assessment: Right lower leg cellulitis  Plan: Discharge home on PO antibiotics

## 2021-07-27 NOTE — ED PROVIDER NOTE - PATIENT PORTAL LINK FT
You can access the FollowMyHealth Patient Portal offered by [facility] by registering at the following website: [email or web address]. By joining [facility]’s FollowMyHealth portal, you will also be able to view your health information using other applications (apps) compatible with our system.

## 2021-07-27 NOTE — ED PROVIDER NOTE - PROGRESS NOTE DETAILS
On re-evaluation, patient states his pain and swelling improved after the medication was administered in the ED. Denies new or worsening symptoms.

## 2021-07-27 NOTE — ED PROVIDER NOTE - PLAN OF CARE
Pain Control & Antibiotic Administration Assessment: Right lower leg cellulitis  Plan: Discharge home on PO antibiotics

## 2021-07-27 NOTE — ED PROVIDER NOTE - CHPI ED SYMPTOMS NEG
no fever/no itching/no petechia/no scaly patches on skin/no vomiting/no bruising/no chills/no decreased eating/drinking

## 2021-07-27 NOTE — ED PROVIDER NOTE - CLINICAL SUMMARY MEDICAL DECISION MAKING FREE TEXT BOX
Duplex US negative for DVT, it is likely patient's symptoms are related to a new cellulitis infection of the right lower limb. Patient is afebrile and on exam, no lymphatic streaking noted therefore there is no concern for Sepsis at this time. Patient was given PO Keflex & Bactrim in the ED and Rx was sent to finish the course.     Patient understands to return to the ED if his symptoms worsen such as increased pain, swelling, lymphatic streaking or fevers.

## 2021-07-27 NOTE — ED PROVIDER NOTE - OBJECTIVE STATEMENT
41 yo Male ppmhx HIV on HAART (levels undetected) presents with Right Lower leg pain, redness and swelling, sent to the ED from Urgent Care to rule out DVT vs. Cellulitis. Patient states it started as a small red dot then spread to his lower leg down to ankle. Denies recent antibiotics use.     ROS: (-) fevers, chills, (-) chest pain, (-) shortness of breath, (-) calf pain.

## 2021-07-27 NOTE — ED PROVIDER NOTE - NSFOLLOWUPINSTRUCTIONS_ED_ALL_ED_FT
1. Follow up with your PMD in 7-10 days for re-evaluation after completing the antibiotics   2. Return to the ED if symptoms worsen: increased redness, swelling, or fevers 100.4F or above.

## 2021-07-27 NOTE — ED ADULT NURSE NOTE - DATE OF LAST VACCINATION
27-Apr-2021
You can access the FollowMyHealth Patient Portal offered by [facility] by registering at the following website: [email or web address]. By joining [employer]’s FollowMyHealth portal, you will also be able to view your health information using other applications (apps) compatible with our system.

## 2021-07-30 ENCOUNTER — EMERGENCY (EMERGENCY)
Facility: HOSPITAL | Age: 41
LOS: 1 days | Discharge: HOME | End: 2021-07-30
Attending: EMERGENCY MEDICINE | Admitting: EMERGENCY MEDICINE
Payer: COMMERCIAL

## 2021-07-30 VITALS
OXYGEN SATURATION: 97 % | WEIGHT: 214.95 LBS | SYSTOLIC BLOOD PRESSURE: 143 MMHG | DIASTOLIC BLOOD PRESSURE: 93 MMHG | TEMPERATURE: 100 F | HEIGHT: 74 IN | HEART RATE: 115 BPM | RESPIRATION RATE: 18 BRPM

## 2021-07-30 VITALS
TEMPERATURE: 99 F | HEART RATE: 90 BPM | OXYGEN SATURATION: 99 % | RESPIRATION RATE: 18 BRPM | DIASTOLIC BLOOD PRESSURE: 76 MMHG | SYSTOLIC BLOOD PRESSURE: 109 MMHG

## 2021-07-30 DIAGNOSIS — Z20.822 CONTACT WITH AND (SUSPECTED) EXPOSURE TO COVID-19: ICD-10-CM

## 2021-07-30 DIAGNOSIS — Z88.6 ALLERGY STATUS TO ANALGESIC AGENT: ICD-10-CM

## 2021-07-30 DIAGNOSIS — Z21 ASYMPTOMATIC HUMAN IMMUNODEFICIENCY VIRUS [HIV] INFECTION STATUS: ICD-10-CM

## 2021-07-30 DIAGNOSIS — R21 RASH AND OTHER NONSPECIFIC SKIN ERUPTION: ICD-10-CM

## 2021-07-30 DIAGNOSIS — R00.1 BRADYCARDIA, UNSPECIFIED: ICD-10-CM

## 2021-07-30 LAB
ALBUMIN SERPL ELPH-MCNC: 3.5 G/DL — SIGNIFICANT CHANGE UP (ref 3.4–5)
ALP SERPL-CCNC: 88 U/L — SIGNIFICANT CHANGE UP (ref 40–120)
ALT FLD-CCNC: 17 U/L — SIGNIFICANT CHANGE UP (ref 12–42)
ANION GAP SERPL CALC-SCNC: 9 MMOL/L — SIGNIFICANT CHANGE UP (ref 9–16)
APTT BLD: 32.1 SEC — SIGNIFICANT CHANGE UP (ref 27.5–35.5)
AST SERPL-CCNC: 15 U/L — SIGNIFICANT CHANGE UP (ref 15–37)
BASOPHILS # BLD AUTO: 0.02 K/UL — SIGNIFICANT CHANGE UP (ref 0–0.2)
BASOPHILS NFR BLD AUTO: 0.3 % — SIGNIFICANT CHANGE UP (ref 0–2)
BILIRUB SERPL-MCNC: 0.5 MG/DL — SIGNIFICANT CHANGE UP (ref 0.2–1.2)
BUN SERPL-MCNC: 9 MG/DL — SIGNIFICANT CHANGE UP (ref 7–23)
CALCIUM SERPL-MCNC: 8.9 MG/DL — SIGNIFICANT CHANGE UP (ref 8.5–10.5)
CHLORIDE SERPL-SCNC: 100 MMOL/L — SIGNIFICANT CHANGE UP (ref 96–108)
CO2 SERPL-SCNC: 25 MMOL/L — SIGNIFICANT CHANGE UP (ref 22–31)
CREAT SERPL-MCNC: 1.26 MG/DL — SIGNIFICANT CHANGE UP (ref 0.5–1.3)
EOSINOPHIL # BLD AUTO: 0.61 K/UL — HIGH (ref 0–0.5)
EOSINOPHIL NFR BLD AUTO: 7.9 % — HIGH (ref 0–6)
GLUCOSE SERPL-MCNC: 115 MG/DL — HIGH (ref 70–99)
HCT VFR BLD CALC: 45.4 % — SIGNIFICANT CHANGE UP (ref 39–50)
HGB BLD-MCNC: 15.5 G/DL — SIGNIFICANT CHANGE UP (ref 13–17)
IMM GRANULOCYTES NFR BLD AUTO: 1.3 % — SIGNIFICANT CHANGE UP (ref 0–1.5)
INR BLD: 1.14 — SIGNIFICANT CHANGE UP (ref 0.88–1.16)
LACTATE SERPL-SCNC: 1.1 MMOL/L — SIGNIFICANT CHANGE UP (ref 0.4–2)
LYMPHOCYTES # BLD AUTO: 0.74 K/UL — LOW (ref 1–3.3)
LYMPHOCYTES # BLD AUTO: 9.6 % — LOW (ref 13–44)
MCHC RBC-ENTMCNC: 31.3 PG — SIGNIFICANT CHANGE UP (ref 27–34)
MCHC RBC-ENTMCNC: 34.1 GM/DL — SIGNIFICANT CHANGE UP (ref 32–36)
MCV RBC AUTO: 91.7 FL — SIGNIFICANT CHANGE UP (ref 80–100)
MONOCYTES # BLD AUTO: 0.75 K/UL — SIGNIFICANT CHANGE UP (ref 0–0.9)
MONOCYTES NFR BLD AUTO: 9.7 % — SIGNIFICANT CHANGE UP (ref 2–14)
NEUTROPHILS # BLD AUTO: 5.52 K/UL — SIGNIFICANT CHANGE UP (ref 1.8–7.4)
NEUTROPHILS NFR BLD AUTO: 71.2 % — SIGNIFICANT CHANGE UP (ref 43–77)
NRBC # BLD: 0 /100 WBCS — SIGNIFICANT CHANGE UP (ref 0–0)
PLATELET # BLD AUTO: 226 K/UL — SIGNIFICANT CHANGE UP (ref 150–400)
POTASSIUM SERPL-MCNC: 3.9 MMOL/L — SIGNIFICANT CHANGE UP (ref 3.5–5.3)
POTASSIUM SERPL-SCNC: 3.9 MMOL/L — SIGNIFICANT CHANGE UP (ref 3.5–5.3)
PROT SERPL-MCNC: 8.2 G/DL — SIGNIFICANT CHANGE UP (ref 6.4–8.2)
PROTHROM AB SERPL-ACNC: 13.6 SEC — SIGNIFICANT CHANGE UP (ref 10.6–13.6)
RBC # BLD: 4.95 M/UL — SIGNIFICANT CHANGE UP (ref 4.2–5.8)
RBC # FLD: 13.2 % — SIGNIFICANT CHANGE UP (ref 10.3–14.5)
SARS-COV-2 RNA SPEC QL NAA+PROBE: SIGNIFICANT CHANGE UP
SODIUM SERPL-SCNC: 134 MMOL/L — SIGNIFICANT CHANGE UP (ref 132–145)
WBC # BLD: 7.74 K/UL — SIGNIFICANT CHANGE UP (ref 3.8–10.5)
WBC # FLD AUTO: 7.74 K/UL — SIGNIFICANT CHANGE UP (ref 3.8–10.5)

## 2021-07-30 PROCEDURE — 71045 X-RAY EXAM CHEST 1 VIEW: CPT | Mod: 26

## 2021-07-30 PROCEDURE — 99285 EMERGENCY DEPT VISIT HI MDM: CPT | Mod: 25

## 2021-07-30 RX ORDER — ACETAMINOPHEN 500 MG
650 TABLET ORAL ONCE
Refills: 0 | Status: COMPLETED | OUTPATIENT
Start: 2021-07-30 | End: 2021-07-30

## 2021-07-30 RX ORDER — SODIUM CHLORIDE 9 MG/ML
3000 INJECTION INTRAMUSCULAR; INTRAVENOUS; SUBCUTANEOUS ONCE
Refills: 0 | Status: COMPLETED | OUTPATIENT
Start: 2021-07-30 | End: 2021-07-30

## 2021-07-30 RX ORDER — VANCOMYCIN HCL 1 G
1500 VIAL (EA) INTRAVENOUS ONCE
Refills: 0 | Status: COMPLETED | OUTPATIENT
Start: 2021-07-30 | End: 2021-07-30

## 2021-07-30 RX ADMIN — SODIUM CHLORIDE 3000 MILLILITER(S): 9 INJECTION INTRAMUSCULAR; INTRAVENOUS; SUBCUTANEOUS at 09:15

## 2021-07-30 RX ADMIN — Medication 650 MILLIGRAM(S): at 09:46

## 2021-07-30 RX ADMIN — Medication 300 MILLIGRAM(S): at 09:40

## 2021-07-30 NOTE — ED PROVIDER NOTE - PROGRESS NOTE DETAILS
Labs are reassuring and pt feeling much better.  likely drug reaction to Bactrim.  Gave strict instructions to f/u with his PCP tomorrow.  Offered derm appt for Monday but he'd rather f/u at [facility].  Will switch to Clindamycin.  Pt is aware to not take bactrim again in future.  Emergent return precautions discussed.

## 2021-07-30 NOTE — ED PROVIDER NOTE - OBJECTIVE STATEMENT
Pt is a 41yo M with a h/o HIV (on HAART, follows at [facility], and VL undetectable) who was here 2 days ago for a RLE cellulitis.  Pt was seen in an outside clinic but sent here to r/o a DVT.  US was negative and pt was started on Bactrim and Keflex.  He reports much improvement of the cellulitis but noted today a diffuse rash. Pt is a 39yo M with a h/o HIV (on HAART, follows at [facility], and VL undetectable) who was here 2 days ago for a RLE cellulitis.  Pt was seen in an outside clinic but sent here to r/o a DVT.  US was negative and pt was started on Bactrim and Keflex.  He reports much improvement of the cellulitis but noted today a diffuse rash.  Rash is not itchy and spares face.  Rash associated with shooting pains down b/l UE - electrical and intermittent, none currently.  Denies any fevers, chills, CP, SOB, cough, abd pain, hematuria, or other concerns.

## 2021-07-30 NOTE — ED PROVIDER NOTE - PHYSICAL EXAMINATION
VITAL SIGNS: I have reviewed nursing notes and confirm.  CONSTITUTIONAL: Well-developed; well-nourished; in no acute distress.  SKIN: scattered rash to extremities and trunk - erythematous maculopapular rash, lesions range from isolated 1mm lesions and others coalesce to patches up to 3-4cm.  Non-blanching.   HEAD: Normocephalic; atraumatic.  EYES: PERRL, EOM intact; conjunctiva and sclera clear.  ENT: No nasal discharge; airway clear.  NECK: Supple; non tender.  CARD: S1, S2 normal; no murmurs, gallops, or rubs. +Tachycardia - regular.  +2 Pulses x 4.   RESP: No wheezes, rales or rhonchi.  ABD: Normal bowel sounds; soft; non-distended; non-tender; no hepatosplenomegaly.  MSK: Normal ROM. No clubbing, cyanosis or edema.  NEURO: Alert, oriented. Grossly unremarkable.  PSYCH: Cooperative, appropriate. VITAL SIGNS: I have reviewed nursing notes and confirm.  CONSTITUTIONAL: Well-developed; well-nourished; in no acute distress.  SKIN: scattered rash to extremities and trunk - erythematous maculopapular rash, lesions range from isolated 1mm lesions and others coalesce to patches up to 3-4cm.  Non-blanching.  RLE - cellulitis - appears as edema is subsiding and redness is less intense than acute infection.  Not circumferential.   HEAD: Normocephalic; atraumatic.  EYES: PERRL, EOM intact; conjunctiva and sclera clear.  ENT: No nasal discharge; airway clear.  NECK: Supple; non tender.  CARD: S1, S2 normal; no murmurs, gallops, or rubs. +Tachycardia - regular.  +2 Pulses x 4.   RESP: No wheezes, rales or rhonchi.  ABD: Normal bowel sounds; soft; non-distended; non-tender; no hepatosplenomegaly.  MSK: Normal ROM. No clubbing, cyanosis or edema.  NEURO: Alert, oriented. Grossly unremarkable.  PSYCH: Cooperative, appropriate.

## 2021-07-30 NOTE — ED ADULT TRIAGE NOTE - CHIEF COMPLAINT QUOTE
Patient to ED with complaint of possible reaction to Keflex and Bactrim that he is taking for cellulitis.  + rash noted and patient states pain in left arm with numbness.  +tachycardic in triage.

## 2021-07-30 NOTE — ED PROVIDER NOTE - CLINICAL SUMMARY MEDICAL DECISION MAKING FREE TEXT BOX
Drug rash vs bacteremia.  Will perform sepsis w/u given HR and low grade temp.  Blood cultures, labs, lactate, IV hydration, and Vancomycin.  Will re-evaluate.     Pt adamantly refusing EKG.  Discussed risk-benefit of not doing this test.  Pt is of clear decision making capacity and still does not want to perform the test and accepts risks of doing so.

## 2021-07-30 NOTE — ED PROVIDER NOTE - NSFOLLOWUPINSTRUCTIONS_ED_ALL_ED_FT
FOLLOW [facility].      BE SURE TO TELL YOUR DOCTOR ABOUT YOUR REACTION TO BACTRIM.      WE WILL SWITCH YOUR ANTIBIOTIC TO CLINDAMYCIN.  PLEASE GO TO YOUR PHARMACY TO FILL YOUR PRESCRIPTIONS.  PLEASE START TODAY AND USE AS DIRECTED.    PLEASE RETURN TO THE ER IMMEDIATELY OR CALL 911 FOR ANY HIGH FEVER, TROUBLE BREATHING, VOMITING, WORSENING RASH, BLEEDING, SEVERE PAIN, OR ANY OTHER CONCERNS.

## 2021-07-30 NOTE — ED PROVIDER NOTE - PATIENT PORTAL LINK FT
You can access the FollowMyHealth Patient Portal offered by [facility] by registering at the following website: [email or web address]. By joining [employer]’s FollowMyHealth portal, you will also be able to view your health information using other applications (apps) compatible with our system.

## 2021-08-02 ENCOUNTER — APPOINTMENT (OUTPATIENT)
Dept: DERMATOLOGY | Facility: CLINIC | Age: 41
End: 2021-08-02

## 2022-06-16 NOTE — ED PROVIDER NOTE - NEURO NEGATIVE STATEMENT, MLM
----- Message from Thad Alfaro sent at 6/16/2022 12:19 PM CDT -----  Regarding: refill  Contact: pt   Refill  oxyCODONE-acetaminophen (PERCOCET) 5-325 mg per tablet 28 tablet     [id number] [place] [id number] AT [facility] OF [address]    
no loss of consciousness, no gait abnormality, no headache, no sensory deficits, and no weakness.

## 2022-08-07 NOTE — ED ADULT NURSE NOTE - PAIN RATING/NUMBER SCALE (0-10): REST
Pt awake now states \"Tell the doctor I'm ready to go. \". UA collected and repeat trop drawn. Will continue to monitor. 4

## 2024-02-05 NOTE — ED ADULT NURSE NOTE - SUICIDE SCREENING DEPRESSION
Well-controlled, continue current medications  Advised to eat slowly and small meals to help with GI side effects from mounjaro   Negative

## 2024-06-13 NOTE — ED ADULT NURSE NOTE - IN THE PAST 12 MONTHS HAVE YOU USED DRUGS OTHER THAN THOSE REQUIRED FOR MEDICAL REASON?
PAST MEDICAL HISTORY:  Basal cell carcinoma LLE s/p Moh's excision    Chronic bronchitis     Hypothyroid     Osteoarthritis     Overactive bladder     Psoriatic arthritis     Squamous cell carcinoma LLE s/p Moh's excision     No
